# Patient Record
(demographics unavailable — no encounter records)

---

## 2024-12-10 NOTE — HISTORY OF PRESENT ILLNESS
[FreeTextEntry1] : Follow up [de-identified] : Mr. WESLEY is a 75 year old male who presents to the office for follow up:  Pt's son unfortunately passed away age 35, 3 weeks ago Patient was very close to his son, he is managing with his wife  Digoxin stopped Pt saw vascular - Dr. Guzman -Plan for pump for lower legs but pt didn't immediately pursue due to death in the family, he will f/up with vascular  124/86 295lb  Pt has taken ativan in the past for anxiety - currently has no medication Lorazepam for anxiety in bereavement period - sent to pharmacy Asked patient to call me if he needs anything, especially during this trying time

## 2025-01-15 NOTE — PHYSICAL EXAM
[General Appearance - Well Developed] : well developed [Normal Appearance] : normal appearance [Well Groomed] : well groomed [General Appearance - Well Nourished] : well nourished [General Appearance - In No Acute Distress] : no acute distress [Normal Conjunctiva] : the conjunctiva exhibited no abnormalities [Eyelids - No Xanthelasma] : the eyelids demonstrated no xanthelasmas [Normal Jugular Venous A Waves Present] : normal jugular venous A waves present [Normal Jugular Venous V Waves Present] : normal jugular venous V waves present [Respiration, Rhythm And Depth] : normal respiratory rhythm and effort [Auscultation Breath Sounds / Voice Sounds] : lungs were clear to auscultation bilaterally [Heart Rate And Rhythm] : heart rate and rhythm were normal [Bowel Sounds] : normal bowel sounds [Abnormal Walk] : normal gait [Nail Clubbing] : no clubbing of the fingernails [Cyanosis, Localized] : no localized cyanosis [Skin Color & Pigmentation] : normal skin color and pigmentation [] : no rash [Oriented To Time, Place, And Person] : oriented to person, place, and time [Impaired Insight] : insight and judgment were intact [Affect] : the affect was normal [Mood] : the mood was normal [FreeTextEntry1] : 2+ pulses in the upper extremities, 1+ pulses in the feet. LE edema, non-pitting; not wearing compression stockings today.

## 2025-01-15 NOTE — DISCUSSION/SUMMARY
[EKG obtained to assist in diagnosis and management of assessed problem(s)] : EKG obtained to assist in diagnosis and management of assessed problem(s) [FreeTextEntry1] : EKG today shows:  Sinus Rhythm at 67 bpm.  First degree A-V block, otherwise unremarkable tracing.  No significant change.  PLAN: 1.  HTN - BP remains in acceptable range  - continue losartan//25 mg daily.  2.  LE edema -  -   low salt diet -   leg elevation when feasible -  continue current meds -   f/u with Dr. Guzman.  3.  Palps - most recent Zio patch recording without significant arrhythmia.  He remains asx. - continue metoprolol and digoxin.    4.  HLD  - continue simvastatin.   - low fat diet.  5.  Ascending Ao - sized unchanged on most recent TTE 7/2024  35  minutes spent on today's office visit.   He will return for a follow-up visit in 6 months

## 2025-01-15 NOTE — HISTORY OF PRESENT ILLNESS
[FreeTextEntry1] : Since I saw him last, he has been under stress, as his son recently  due to a pulmonary embolism.  He is coping well with the support of his wife and family.  He describes no palpitations.  There have been no episodes of exertional CP or MOLINA. He reports no episodes of lightheadedness & no LOC.  There have been no episodes of orthopnea or PND.    He continues to use a Rollator when ambulating and seem fairly sedentary overall.  He saw Dr. Guzman recently regarding his chronic LE edema.  Medications are unchanged.

## 2025-01-15 NOTE — REASON FOR VISIT
[FreeTextEntry1] :   David Henderson returns for follow-up regarding hypertension, mild mitral and aortic valve insufficiency, hyperlipidemia and remote episode of PAF.

## 2025-01-15 NOTE — ASSESSMENT
[FreeTextEntry1] : ======================= Hypertension  Mild mitral and aortic valve insufficiency.  Palpitations, with VPCs and APCs seen. Isolated episode of paroxysmal atrial fibrillation in March, 2015; f/o Zio recording in 12/2020 without significant arrhythmia.  Mild enlargement of the ascending aorta and aortic arch  Hyperlipidemia  Excess weight.  Enhanced vasovagal reflex.

## 2025-04-09 NOTE — HISTORY OF PRESENT ILLNESS
[FreeTextEntry1] : Follow up, bereavement, anxiety, lower ext. edema [de-identified] : Mr. WESLEY is a 75 year old male who presents to the office for follow up:  Leg swelling has been stable, slightly improved Pt's son unfortunately passed away age 35 - he has been managing okay, has good support with his wife He has been seeing bereavement provider which has been helpful  Med rec: HCTZ Metoprolol Simvastatin  Off losartan and digoxin  Pt saw vascular - Dr. Guzman -Plan for pump for lower legs but pt didn't immediately pursue due to death in the family, he will f/up with vascular  Precribed ativan for patient during bereavement period - he is using it rarely/ PRN, has been helpful  Dr Guzman- vascular 5. Ascending Ao - sized unchanged on most recent TTE 7/2024  Continue lorazepam PRN Continue current medication regimen otherwise, tolerating well  F/up vascular

## 2025-04-09 NOTE — HISTORY OF PRESENT ILLNESS
[FreeTextEntry1] : Follow up, bereavement, anxiety, lower ext. edema [de-identified] : Mr. WESLEY is a 75 year old male who presents to the office for follow up:  Leg swelling has been stable, slightly improved Pt's son unfortunately passed away age 35 - he has been managing okay, has good support with his wife He has been seeing bereavement provider which has been helpful  Med rec: HCTZ Metoprolol Simvastatin  Off losartan and digoxin  Pt saw vascular - Dr. Guzman -Plan for pump for lower legs but pt didn't immediately pursue due to death in the family, he will f/up with vascular  Precribed ativan for patient during bereavement period - he is using it rarely/ PRN, has been helpful  Dr Guzman- vascular 5. Ascending Ao - sized unchanged on most recent TTE 7/2024  Continue lorazepam PRN Continue current medication regimen otherwise, tolerating well  F/up vascular

## 2025-05-19 NOTE — REASON FOR VISIT
[de-identified] : FB, Right VATS, conversion to thoracotomy, evacuation of hemothorax, decortication [de-identified] : 4/29/25

## 2025-05-19 NOTE — REASON FOR VISIT
[de-identified] : FB, Right VATS, conversion to thoracotomy, evacuation of hemothorax, decortication [de-identified] : 4/29/25

## 2025-05-19 NOTE — ASSESSMENT
[FreeTextEntry1] : Mr. STEVE WESLEY, 75 year old male, never a smoker, w/ hx of HTN, HLD, prostate cancer (s/p prostatectomy in 2000), AFib, peripheral neuropathy, who was admitted at [facility] (4/8-4/24/25) s/p fall resulting in a large right hemothorax and pneumothorax, requiring chest tube placement (subsequently removed). Patient with multiple rib fractures (including segmental fractures of the 4th, 5th, and 7th ribs and an anterior 2nd rib fracture), a right scapula fracture, and a comminuted right distal clavicle fracture. Hospital course was further complicated by AFib (now on Eliquis) and UTI (s/p IV abx). He was discharged to rehab, where he was noted with fever. Admitted back to [facility] (4/27-5/7/25), workup imaging revealed large right pleural effusion. Thoracic consult - plan for VATs.  Subsequently, he underwent FB, Right VATS, conversion to thoracotomy, evacuation of hemothorax, decortication on 4/29/25. Path of right fib reveals segment of rib showing bone with active remodeling consistent with fracture callus.  CXR today--- stable post-op changes.   Presents today for post-op visit. Overall, he reports to be feeling well. Denies any chest pain, shortness of breath, cough, hemoptysis, fever, or chills.  Surgical incision healing well, no sign of infection noted. Staples and stitches removed in office without any complication.   I have reviewed the patient's medical records and diagnostic images at time of this office consultation and have made the following recommendation: 1. CXR reviewed, stable post-op findings. Path negative for malignancy. I discussed no further thoracic surgery intervention at this time. Continue follow up with PCP per recommendation. RTC as needed.   Recommendations reviewed with patient during this office visit, and all questions answered; Patient instructed on the importance of follow up and verbalizes understanding.    I, MAURICIO Krishnan, personally performed the evaluation and management (E/M) services for this established patient. That E/M includes conducting the examination, assessing all new/exacerbated conditions, and establishing a new plan of care. Today, my ACP, Anam Parra NP, was here to observe my evaluation and management services for this new problem/exacerbated condition to be followed going forward.

## 2025-05-19 NOTE — ASSESSMENT
[FreeTextEntry1] : Mr. STEVE WESLEY, 75 year old male, never a smoker, w/ hx of HTN, HLD, prostate cancer (s/p prostatectomy in 2000), AFib, peripheral neuropathy, who was admitted at [facility] (4/8-4/24/25) s/p fall resulting in a large right hemothorax and pneumothorax, requiring chest tube placement (subsequently removed). Patient with multiple rib fractures (including segmental fractures of the 4th, 5th, and 7th ribs and an anterior 2nd rib fracture), a right scapula fracture, and a comminuted right distal clavicle fracture. Hospital course was further complicated by AFib (now on Eliquis) and UTI (s/p IV abx). He was discharged to rehab, where he was noted with fever. Admitted back to [facility] (4/27-5/7/25), workup imaging revealed large right pleural effusion. Thoracic consult - plan for VATs.  Subsequently, he underwent FB, Right VATS, conversion to thoracotomy, evacuation of hemothorax, decortication on 4/29/25. Path of right fib reveals segment of rib showing bone with active remodeling consistent with fracture callus.  CXR today--- stable post-op changes.   Presents today for post-op visit. Overall, he reports to be feeling well. Denies any chest pain, shortness of breath, cough, hemoptysis, fever, or chills.  Surgical incision healing well, no sign of infection noted. Staples and stitches removed in office without any complication.   I have reviewed the patient's medical records and diagnostic images at time of this office consultation and have made the following recommendation: 1. CXR reviewed, stable post-op findings. Path negative for malignancy. I discussed no further thoracic surgery intervention at this time. Continue follow up with PCP per recommendation. RTC as needed.   Recommendations reviewed with patient during this office visit, and all questions answered; Patient instructed on the importance of follow up and verbalizes understanding.    I, MAURICIO Krishnan, personally performed the evaluation and management (E/M) services for this established patient. That E/M includes conducting the examination, assessing all new/exacerbated conditions, and establishing a new plan of care. Today, my ACP, Anam Parra NP, was here to observe my evaluation and management services for this new problem/exacerbated condition to be followed going forward. Simple / Intermediate / Complex Repair - Final Wound Length In Cm: 4.2

## 2025-05-23 NOTE — PHYSICAL EXAM
[Wheelchair] : uses a wheelchair [Rad] : radial 2+ and symmetric bilaterally [Normal] : Alert and in no acute distress [Poor Appearance] : well-appearing [Acute Distress] : not in acute distress [Obese] : not obese [de-identified] : The patient has no respiratory distress. Mood and affect are normal. The patient is alert and oriented to person, place and time. Examination of the cervical spine demonstrates no tenderness, no deformity and no muscle spasm. Cervical spine rotation is 60 to the right, 60 to the left, 75 of extension and 45 of flexion. Neurologic exam of the upper extremities reveals intact sensation to light touch. Motor function is 5 over 5 in all groups. Deep tendon reflexes are 2+ and equal at the biceps, triceps and brachioradialis. Examination of the right shoulder demonstrates no tenderness.  He has active elevation of 80 degrees with mild pain.  There is no tenderness of clavicle, humerus or scapula on the right.  He does have tenderness of the rib cage. [de-identified] : 2 x-ray views of the right clavicle demonstrate union of distal clavicle fracture.  2 views of the right scapula demonstrate union of glenoid neck scapular fracture.

## 2025-05-23 NOTE — HISTORY OF PRESENT ILLNESS
[de-identified] : 75-year-old RHD male presents for evaluation of right clavicle and right scapula fracture. He took a fall down the stairs on to the right side 4/8/25. He reports being admitted to [facility], was in the ICU for hemothorax and transferred to [facility]. He was readmitted to the hospital and underwent CT surgery 4/29 for thoracotomy, evacuation of hemothorax. He has returned to rehab and is in a sling for his clavicle injury. He is having difficulty with standing in PT since he is only able to use one arm. He is here today to get clearance to remove the sling to be able to use his right arm in PT.

## 2025-05-23 NOTE — DISCUSSION/SUMMARY
[de-identified] : The patient has healed fracture of the right clavicle and scapula.  He will discontinue immobilization.  He is permitted weightbearing on the right upper extremity.  He requires physical therapy to improve his right shoulder function.  He can be reevaluated in 2 months.

## 2025-07-17 NOTE — DISCUSSION/SUMMARY
[EKG obtained to assist in diagnosis and management of assessed problem(s)] : EKG obtained to assist in diagnosis and management of assessed problem(s) [FreeTextEntry1] : EKG today shows: Atrial fibrillation with a moderate VR.  Normal intervals and axis.  NS ST/T abn.    PLAN: 1.  Recurrent A. fib. -  continue A/C -  will change diltiazem to once daily form for convenience.  Depending on LE edema, may need to consider switch back to digoxin in future. -  will reduce metoprolol XL to 25 mg bid due to complaint of excessive fatigue. -  will wear Zio XT for 2 weeks to get better idea of persistence of AF (?periods of SR) and rate control. -  will consider EP consult re rhythm management and ?Watchman; given his gait instability and recent fall he may not be a good candidate for long-term A/C HTN - BP remains in acceptable range  2.  HTN -  continue current meds.  3.  LE edema -  -   low salt diet -   leg elevation when feasible -   now on furosemide; will continue  -   may need to reconsider diltiazem, as above.  4.  HLD  - continue statin.   - low fat diet.  5.  Ascending Ao - sized unchanged on most recent TTE 7/2024  45 minutes spent on today's office visit.   He will return for a follow-up visit in 2 months

## 2025-07-17 NOTE — HISTORY OF PRESENT ILLNESS
[FreeTextEntry1] : 1/15/25 Since I saw him last, he has been under stress, as his son recently  due to a pulmonary embolism.  He is coping well with the support of his wife and family.  He describes no palpitations.  There have been no episodes of exertional CP or MOLINA. He reports no episodes of lightheadedness & no LOC.  There have been no episodes of orthopnea or PND.   He continues to use a Rollator when ambulating and seem fairly sedentary overall.  He saw Dr. Guzman recently regarding his chronic LE edema. Medications are unchanged.   25 He was recently hospitalized.  He fell down a flight of stairs, suffering multiple rib fx., fractured clavicle and scapula.  PAF was seen and he was started on A/C; he returned to .  After his stay at [facility], he was tx. to rehab.  While there, he became hypotensive and was transferred back to [facility] and a hemothorax was diagnosed; this required surgical drainage. He continues with rehab.   Tin reports no episodes of exertional chest discomfort or dyspnea. There have been no palpitations, and no episodes of lightheadedness or loss of consciousness. There have been no episodes of orthopnea or PND.

## 2025-07-17 NOTE — REASON FOR VISIT
[FreeTextEntry1] : PRELIMINARY NOTE  David Henderson returns for follow-up regarding hypertension, mild mitral and aortic valve insufficiency, hyperlipidemia and remote episode of PAF.

## 2025-07-17 NOTE — HISTORY OF PRESENT ILLNESS
[FreeTextEntry1] : 1/15/25 Since I saw him last, he has been under stress, as his son recently  due to a pulmonary embolism.  He is coping well with the support of his wife and family.  He describes no palpitations.  There have been no episodes of exertional CP or MOLINA. He reports no episodes of lightheadedness & no LOC.  There have been no episodes of orthopnea or PND.   He continues to use a Rollator when ambulating and seem fairly sedentary overall.  He saw Dr. Guzman recently regarding his chronic LE edema. Medications are unchanged.   25 He was recently hospitalized.  He fell down a flight of stairs, suffering multiple rib fx., fractured clavicle and scapula.  PAF was seen and he was started on A/C; he returned to .  After his stay at [facility], he was tx. to rehab.  While there, he became hypotensive and was transferred back to [facility] and a hemothorax was diagnosed; this required surgical drainage. He continues with rehab.   Tin reports no episodes of exertional chest discomfort or dyspnea. There have been no palpitations, and no episodes of lightheadedness or loss of consciousness. There have been no episodes of orthopnea or PND.